# Patient Record
(demographics unavailable — no encounter records)

---

## 2017-07-25 NOTE — EDM.PDOCBH
ED HPI GENERAL MEDICAL PROBLEM





- General


Chief Complaint: Behavioral/Psych


Stated Complaint: SUICIDAL THOUGHTS


Time Seen by Provider: 07/25/17 19:41


Source of Information: Reports: Patient, RN Notes Reviewed


History Limitations: Reports: No Limitations (The patient is hostile and states 

that he does not want to be here, but does answer questions)





- History of Present Illness


INITIAL COMMENTS - FREE TEXT/NARRATIVE: 





The patient states that he has been using drugs for about the past 10 years, 

including marijuana, cocaine, methamphetamine, heroin, and hallucinogens. He 

states that his drug use is "catching up with me" and that his "life is getting 

shittier". He states that he was walking around [facility] earlier today and 

thought "I guess it's time". He passed a snap chat with wording indicating that 

he was going to commit suicide, such as "Peace out. It's my time". He states 

that a viewer likely called 911. The police went to [facility], found the patient, 

and brought him to the ED.





The patient acknowledges that he is suicidal, and states that he has a plan to 

intentionally overdosed on methamphetamine, either by smoking it or eating it.





He states that he attempted suicide about 2 years ago by taking 30 OxyContin. 

He states that nothing happened, he told no one, never received a medical 

evaluation, and was not psychiatrically hospitalized.





He states that he has never been psychiatrically hospitalized. He states that 

he saw a therapist last year for a few visits, for depression, but no 

psychiatric medicines were prescribed, in fact, the patient states that he has 

never been on psychiatric medications.





The patient states that he smokes marijuana regularly, but does not currently 

use other recreational drugs, including methamphetamine. He states that while 

he has tried heroin in the past, he hated it.





The patient does not have a PCP.





- Related Data


 Allergies











Allergy/AdvReac Type Severity Reaction Status Date / Time


 


No Known Allergies Allergy   Verified 07/25/17 18:55











Home Meds: 


 Home Meds





. [No Known Home Meds]  06/22/15 [History]











Past Medical History


Psychiatric History: Reports: ADHD, Addiction





Social & Family History





- Tobacco Use


Smoking Status *Q: Current Every Day Smoker


Years of Tobacco use: 6


Packs/Tins Daily: 2





- Alcohol Use


Alcohol Use History: Yes


Days Per Week of Alcohol Use: 0


Alcohol Use Frequency: Socially





- Recreational Drug Use


Recreational Drug Use: Yes


Drug Use in Last 12 Months: Yes


Recreational Drug Type: Reports: Amphetamines (Speed), Cocaine, Ecstasy, Heroin

, LSD (Acid), Marijuana/Hashish (regularly), Methamphetamine, Oxycodone, 

Psilocybin (Mushrooms)





- Living Situation & Occupation


Living situation: Reports: Single, Other (Homeless)


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





ED ROS GENERAL





- Review of Systems


Review Of Systems: See Below


Constitutional: Reports: No Symptoms


HEENT: Reports: No Symptoms


Respiratory: Reports: No Symptoms


Cardiovascular: Reports: No Symptoms


Endocrine: Reports: No Symptoms


GI/Abdominal: Reports: No Symptoms


: Reports: No Symptoms


Musculoskeletal: Reports: No Symptoms


Skin: Reports: No Symptoms


Neurological: Reports: No Symptoms


Psychiatric: Reports: No Symptoms


Hematologic/Lymphatic: Reports: No Symptoms


Immunologic: Reports: No Symptoms





ED EXAM, BEHAVIORAL HEALTH





- Physical Exam


Exam: See Below


Exam Limited By: No Limitations


General Appearance: Alert, WD/WN, No Apparent Distress


Eye Exam: Bilateral Eye: Normal Inspection


Ears: Normal External Exam, Hearing Grossly Normal


Nose: Normal Inspection, No Blood


Throat/Mouth: Normal Inspection, Normal Lips, Normal Voice, No Airway Compromise


Head: Atraumatic, Normocephalic


Neck: Normal Inspection, Full Range of Motion


Respiratory/Chest: No Respiratory Distress, Lungs Clear, Normal Breath Sounds, 

No Accessory Muscle Use


Cardiovascular: Normal Peripheral Pulses, Regular Rate, Rhythm, No Gallop, No 

JVD, No Murmur, No Rub


GI/Abdominal: Normal Bowel Sounds, Soft, Non-Tender, No Organomegaly, No 

Distention, No Abnormal Bruit, No Mass


 (Male) Exam: Deferred


Rectal (Males) Exam: Deferred


Back Exam: Normal Inspection, Full Range of Motion, NT


Extremities: Normal Inspection, Normal Range of Motion, No Pedal Edema, Normal 

Capillary Refill


Neurological: Alert, Normal Cognition, No Motor/Sensory Deficits, Oriented x 3


Psychiatric: Poor Eye Contact, Withdrawn, Suicidal Plan


Skin Exam: Warm, Dry, Intact, Normal color, No rash





EKG INTERPRETATION


EKG Date: 07/25/17


Time: 20:13


Rhythm: Other (Sinus bradycardia)


Rate (Beats/Min): 57


Axis: Normal


P-Wave: Present


QRS: Other (Nonspecific intraventricular conduction delay, likely incomplete 

RBBB)


ST-T: Normal


QT: Normal


Comparison: NA - No Prior EKG





COURSE, BEHAVIORAL HEALTH COMP





- Course


Vital Signs: 


 Last Vital Signs











Temp  36.8 C   07/25/17 18:56


 


Pulse  63   07/25/17 18:56


 


Resp  15   07/25/17 18:56


 


BP  129/77   07/25/17 18:56


 


Pulse Ox  97   07/25/17 18:56











Orders, Labs, Meds: 


 Active Orders 24 hr











 Category Date Time Status


 


 EKG Documentation Completion [RC] STAT Care  07/25/17 19:57 Active








 Laboratory Tests











  07/25/17 07/25/17 07/25/17 Range/Units





  20:13 20:13 20:13 


 


WBC  8.88    (4.23-9.07)  K/mm3


 


RBC  5.36    (4.63-6.08)  M/mm3


 


Hgb  15.7    (13.7-17.5)  gm/L


 


Hct  43.7    (40.1-51.0)  %


 


MCV  81.5    (79.0-92.2)  fl


 


MCH  29.3    (25.7-32.2)  pg


 


MCHC  35.9 H    (32.2-35.5)  g/dl


 


RDW Std Deviation  38.9    (35.1-43.9)  fL


 


Plt Count  192    (163-337)  K/mm3


 


MPV  10.8    (9.4-12.3)  fl


 


Neutrophils % (Manual)  80 H    (40-60)  %


 


Band Neutrophils %  0    (0-10)  %


 


Lymphocytes % (Manual)  16 L    (20-40)  %


 


Atypical Lymphs %  0    %


 


Monocytes % (Manual)  0 L    (2-10)  %


 


Eosinophils % (Manual)  3    (0.8-7.0)  %


 


Basophils % (Manual)  1    (0.2-1.2)  


 


Platelet Estimate  Adequate    


 


Plt Morphology Comment  Normal    


 


RBC Morph Comment  Normal    


 


Sodium   144   (136-145)  mEq/L


 


Potassium   2.8 L   (3.5-5.1)  mEq/L


 


Chloride   104   ()  mEq/L


 


Carbon Dioxide   27   (21-32)  mEq/L


 


Anion Gap   15.8 H   (5-15)  


 


BUN   14   (7-18)  mg/dL


 


Creatinine   1.4 H   (0.7-1.3)  mg/dL


 


Est Cr Clr Drug Dosing   97.86   mL/min


 


Estimated GFR (MDRD)   > 60   (>60)  mL/min


 


BUN/Creatinine Ratio   10.0 L   (14-18)  


 


Glucose   83   ()  mg/dL


 


Calcium   9.5   (8.5-10.1)  mg/dL


 


Total Bilirubin   1.2 H   (0.2-1.0)  mg/dL


 


AST   33   (15-37)  U/L


 


ALT   31   (16-63)  U/L


 


Alkaline Phosphatase   70   ()  U/L


 


Total Protein   8.2   (6.4-8.2)  g/dl


 


Albumin   4.4   (3.4-5.0)  g/dl


 


Globulin   3.8   gm/dL


 


Albumin/Globulin Ratio   1.2   (1-2)  


 


TSH 3rd Generation   0.477 L   (0.516-4.13)  uIU/mL


 


Salicylates    3.4  (2.8-20)  mg/dL


 


Urine Opiates Screen     (NEGATIVE)  


 


Ur Buprenorphine Scrn     (NEGATIVE)  


 


Ur Oxycodone Screen     (NEGATIVE)  


 


Urine Methadone Screen     (NEGATIVE)  


 


Ur Propoxyphene Screen     (NEGATIVE)  


 


Acetaminophen   0 L   (10-30)  ug/mL


 


Ur Barbiturates Screen     (NEGATIVE)  


 


Ur Tricyclics Screen     (NEGATIVE)  


 


Ur Phencyclidine Scrn     (NEGATIVE)  


 


Ur Amphetamine Screen     (NEGATIVE)  


 


U Methamphetamines Scrn     (NEGATIVE)  


 


U Benzodiazepines Scrn     (NEGATIVE)  


 


U Cocaine Metab Screen     (NEGATIVE)  


 


U Marijuana (THC) Screen     (NEGATIVE)  


 


Ethyl Alcohol   0.00   (0.00)  gm%














  07/25/17 07/26/17 07/26/17 Range/Units





  21:13 01:20 05:20 


 


WBC     (4.23-9.07)  K/mm3


 


RBC     (4.63-6.08)  M/mm3


 


Hgb     (13.7-17.5)  gm/L


 


Hct     (40.1-51.0)  %


 


MCV     (79.0-92.2)  fl


 


MCH     (25.7-32.2)  pg


 


MCHC     (32.2-35.5)  g/dl


 


RDW Std Deviation     (35.1-43.9)  fL


 


Plt Count     (163-337)  K/mm3


 


MPV     (9.4-12.3)  fl


 


Neutrophils % (Manual)     (40-60)  %


 


Band Neutrophils %     (0-10)  %


 


Lymphocytes % (Manual)     (20-40)  %


 


Atypical Lymphs %     %


 


Monocytes % (Manual)     (2-10)  %


 


Eosinophils % (Manual)     (0.8-7.0)  %


 


Basophils % (Manual)     (0.2-1.2)  


 


Platelet Estimate     


 


Plt Morphology Comment     


 


RBC Morph Comment     


 


Sodium     (136-145)  mEq/L


 


Potassium   3.0 L  3.2 L  (3.5-5.1)  mEq/L


 


Chloride     ()  mEq/L


 


Carbon Dioxide     (21-32)  mEq/L


 


Anion Gap     (5-15)  


 


BUN     (7-18)  mg/dL


 


Creatinine     (0.7-1.3)  mg/dL


 


Est Cr Clr Drug Dosing     mL/min


 


Estimated GFR (MDRD)     (>60)  mL/min


 


BUN/Creatinine Ratio     (14-18)  


 


Glucose     ()  mg/dL


 


Calcium     (8.5-10.1)  mg/dL


 


Total Bilirubin     (0.2-1.0)  mg/dL


 


AST     (15-37)  U/L


 


ALT     (16-63)  U/L


 


Alkaline Phosphatase     ()  U/L


 


Total Protein     (6.4-8.2)  g/dl


 


Albumin     (3.4-5.0)  g/dl


 


Globulin     gm/dL


 


Albumin/Globulin Ratio     (1-2)  


 


TSH 3rd Generation     (0.516-4.13)  uIU/mL


 


Salicylates     (2.8-20)  mg/dL


 


Urine Opiates Screen  Negative    (NEGATIVE)  


 


Ur Buprenorphine Scrn  Negative    (NEGATIVE)  


 


Ur Oxycodone Screen  Negative    (NEGATIVE)  


 


Urine Methadone Screen  Negative    (NEGATIVE)  


 


Ur Propoxyphene Screen  Negative    (NEGATIVE)  


 


Acetaminophen     (10-30)  ug/mL


 


Ur Barbiturates Screen  Negative    (NEGATIVE)  


 


Ur Tricyclics Screen  Negative    (NEGATIVE)  


 


Ur Phencyclidine Scrn  Negative    (NEGATIVE)  


 


Ur Amphetamine Screen  Negative    (NEGATIVE)  


 


U Methamphetamines Scrn  Negative    (NEGATIVE)  


 


U Benzodiazepines Scrn  Negative    (NEGATIVE)  


 


U Cocaine Metab Screen  Negative    (NEGATIVE)  


 


U Marijuana (THC) Screen  Presumptive positive H    (NEGATIVE)  


 


Ethyl Alcohol     (0.00)  gm%








Medications














Discontinued Medications














Generic Name Dose Route Start Last Admin





  Trade Name Freq  PRN Reason Stop Dose Admin


 


Potassium Chloride  40 meq  07/25/17 22:38  07/25/17 23:12





  Klor-Con M20  PO  07/25/17 22:39  40 meq





  ONETIME ONE   Administration


 


Potassium Chloride  40 meq  07/26/17 03:36  07/26/17 03:43





  Klor-Con M20  PO  07/26/17 03:37  40 meq





  ONETIME ONE   Administration











Medical Clearance: 





07/25/17 22:35


The patient's medical evaluation finds only hypokalemia. I will order 40 mEq 

potassium chloride, and recheck his potassium later tonight. Presuming he is 

then medically cleared, the patient will require involuntary committal, as he 

is hostile, and has our he stated that he does not want to be here, yet admits 

that he is actively suicidal. Since the patient will require transfer by the 

's department, and they will not be able to do that until the morning, I 

am not going to seek a psychiatric bed until tomorrow morning.








07/26/17 06:17


Following 2 doses of oral potassium chloride 40 mEq, the patient's potassium 

level is up to 3.2.





Case discussed with Dr. Fernando, Psychiatrist at [facility] at 05:57. 

She accepts the patient for transfer.





Departure





- Departure


Time of Disposition: 06:29


Disposition: DC/Tfer to Psych Hosp/[facility]


Condition: Good


Clinical Impression: 


 Suicidal ideation, Hypokalemia








- Discharge Information





- My Orders


Last 24 Hours: 


My Active Orders





07/25/17 19:57


EKG Documentation Completion [RC] STAT 














- Assessment/Plan


Last 24 Hours: 


My Active Orders





07/25/17 19:57


EKG Documentation Completion [RC] STAT

## 2017-08-04 NOTE — CT
Head CT

 

Technique: Multiple axial sections through the brain were obtained.  

Intravenous contrast was utilized.

 

Comparison: Previous head CT study of 08/25/15.

 

Findings: Ventricles along with basal cisterns and sulci over the 

convexities appear within normal limits for the patient's age.  No 

abnormal parenchymal densities are seen.  No evidence of intracranial 

hemorrhage.  No midline shift or mass effect is seen.

 

Minimal area of mucosal thickening is seen within the left maxillary 

sinus which is incidental.  No acute calvarial abnormality is 

appreciated.

 

Impression:

1.  Minimal sinus finding which is felt to be incidental.

2.  No acute intracranial abnormality is identified on noncontrast 

head CT study.  No significant change is seen from prior head CT exam.

 

Diagnostic code #2

## 2017-08-04 NOTE — CT
CT cervical spine

 

Technique: Multiple axial sections were obtained from above C1 

inferiorly to the bottom of T1.  Reconstructed sagittal and coronal 

images were reviewed.

 

Comparison: Previous CT cervical spine exam of 01/02/17.

 

Findings: Vertebral body heights and disc spaces are preserved.  

Minimal bony density is seen off the anterior and superior endplate of

 C6 which is stable from prior exam and is therefore incidental.  

Vertebral bodies and posterior arches are intact with no fracture 

being seen.  No bony central bony neural foraminal stenosis is seen.  

Posterior skull base is intact.  No abnormal subluxation is seen on 

the reconstructed sagittal images.

 

Impression:

1.  Incidental finding.  Nothing acute is seen on CT study of the 

cervical spine.  No significant change is seen from prior CT cervical 

spine exam.

 

Diagnostic code #2
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
## 2017-08-04 NOTE — CT
CT chest

 

Technique: Multiple axial sections were obtained through the chest.  

Intravenous contrast was utilized.

 

Comparison: No previous previous noncontrast chest CT of 1/2/17.

 

Findings: Mediastinum and hilar regions show no adenopathy or mass.  

Opacified great vessels are within normal limits.  No pericardial 

thickening is seen.  Lungs are clear.  No pneumothorax, pleural 

effusion or abnormal parenchymal densities are seen.  Bone window 

settings show no discrete rib fracture.

 

Impression:

1.  No abnormality identified on CT study of the chest.

 

Diagnostic code #1

 

 

 

CT abdomen and pelvis

 

Technique: Multiple axial sections were obtained from above the dome 

of the diaphragm inferiorly through the pubic symphysis.  Intravenous 

contrast was utilized.  No oral contrast was utilized.

 

Comparison: No previous abdominal or pelvic CT exam is available.

 

Findings: Liver shows no focal parenchymal abnormality.  Spleen 

appears within normal limits.  Adrenal glands show no nodule.  

Pancreas is within normal limits.  Kidneys show symmetric contrast 

enhancement without hydronephrosis or mass.  Aorta shows no aneurysmal

 dilatation.  No retroperitoneal adenopathy or mesenteric 

abnormalities are seen.  No pelvic mass or adenopathy is seen.  

Delayed images show contrast within the distal left ureter and 

bladder.  Incidental extrarenal pelvis noted on the right side.

 

Bone window settings were reviewed which show no discrete hip or 

pelvis abnormality.

 

Impression:

1.  No abnormality is identified on CT study of the abdomen and 

pelvis.

 

Diagnostic code #1

## 2017-08-04 NOTE — CT
CT lumbar spine

 

Technique: Multiple axial sections were obtained to the lumbar spine. 

 Reconstructed coronal and sagittal images were reviewed.

 

Findings: Vertebral body heights and disc spaces are maintained.  No 

fracture is seen within the lumbar spine.  No bony central or bony 

neural foraminal stenosis is seen.  No traumatic disc herniation is 

seen.  Mild disc space narrowing is incidentally noted at L5-S1 which 

is felt to be physiologic.  No abnormal subluxation is seen on the 

reconstructed sagittal images.

 

Impression:

1.  No abnormality is identified on CT study of the lumbar spine.

 

Diagnostic code #1

## 2017-08-04 NOTE — CT
CT thoracic spine

 

Technique: Multiple axial sections through the thoracic spine were 

obtained.  Reconstructed sagittal images were reviewed.

 

Findings: Slight scoliosis is seen.  Vertebral body heights and disc 

spaces are maintained.  No fracture is identified.  No bony central or

 bony neural foraminal stenosis is seen.  No gross disc herniation is 

seen.  No paravertebral soft tissue swelling is identified.

 

Impression:

1.  Minimal scoliosis.

2.  Nothing acute is appreciated on CT study of the thoracic spine.

 

Diagnostic code #2